# Patient Record
Sex: FEMALE | Race: WHITE | NOT HISPANIC OR LATINO | URBAN - METROPOLITAN AREA
[De-identification: names, ages, dates, MRNs, and addresses within clinical notes are randomized per-mention and may not be internally consistent; named-entity substitution may affect disease eponyms.]

---

## 2018-03-13 NOTE — PATIENT DISCUSSION
PINGUECULA, OD&gt;OS:  INFLAMED OD - LOTEMAX QID THEN BID. PRESCRIBED ARTIFICIAL TEARS BID - QID, OU.   RETURN FOR FOLLOW UP.

## 2018-03-13 NOTE — PATIENT DISCUSSION
Pinguecula Counseling:  I have explained to the patient at length the diagnosis of pinguecula and its pathophysiology. I recommended the patient adequately protect their eyes from excessive UV light and dry, louann conditions. The use of artificial tears in dry conditions was encouraged. Return for follow-up as scheduled.

## 2018-04-27 NOTE — PATIENT DISCUSSION
New Prescription: TobraDex (tobramycin-dexamethasone): ointment: 0.3-0.1% 1 a small amount at bedtime as directed into affected eye 04-

## 2018-04-27 NOTE — PATIENT DISCUSSION
PINGUECULA, OD&gt;OS: INFLAMED OD -CONTINUE LOTEMAX BID. PRESCRIBED TOBRADEX GUILLE QHS. CONSULT DR WILLIAMSON FOR CONSULT. CONTINUE ARTIFICIAL TEARS BID - QID, OU.  RETURN FOR FOLLOW UP.

## 2018-04-27 NOTE — PATIENT DISCUSSION
MILD DRY EYES: CONTINUE ARTIFICIAL TEARS BID - QID, OU RETURN FOR FOLLOW-UP AS SCHEDULED OR SOONER IF SYMPTOMS WORSEN.

## 2018-05-18 NOTE — PATIENT DISCUSSION
pt believes it has been present for about 3 months. Early stages of Pterygium. Use Lotemax sparingly. Encouraged sun coverage and AT. Explained Pterygium Excision, pt stated not ready for sx at this time. Maybe around December? pt is  for job, try to find down time of 2 weeks at sx. Follow.

## 2018-05-23 ENCOUNTER — IMPORTED ENCOUNTER (OUTPATIENT)
Dept: URBAN - METROPOLITAN AREA CLINIC 9 | Facility: CLINIC | Age: 83
End: 2018-05-23

## 2018-06-04 ENCOUNTER — IMPORTED ENCOUNTER (OUTPATIENT)
Dept: URBAN - METROPOLITAN AREA CLINIC 9 | Facility: CLINIC | Age: 83
End: 2018-06-04

## 2018-06-11 ENCOUNTER — IMPORTED ENCOUNTER (OUTPATIENT)
Dept: URBAN - METROPOLITAN AREA CLINIC 9 | Facility: CLINIC | Age: 83
End: 2018-06-11

## 2018-06-21 ENCOUNTER — IMPORTED ENCOUNTER (OUTPATIENT)
Dept: URBAN - METROPOLITAN AREA CLINIC 9 | Facility: CLINIC | Age: 83
End: 2018-06-21

## 2018-11-29 NOTE — PATIENT DISCUSSION
Removal of Pterygium OD scheduled 12/17/2018  Patient's mom will be with her for surgery that day. Patient knows that her mom will need to stay with her during the surgery. Patient reports that she usually gets nauseated after surgery, much discussion about medications needed. Patient understands that she will need to stay away from horses and kady for about 2 weeks after the surgery.

## 2018-12-18 NOTE — PATIENT DISCUSSION
New Prescription: Maxitrol (neomycin-polymyxin-dexameth): ointment: 3.5-10,000-0.1 mg-unit/g-% a small amount once a day into left eye 12-

## 2018-12-27 NOTE — PATIENT DISCUSSION
Continue: Maxitrol (neomycin-polymyxin-dexameth): ointment: 3.5-10,000-0.1 mg-unit/g-% a small amount once a day into left eye 12-

## 2019-01-10 NOTE — PATIENT DISCUSSION
Continue: Maxitrol (neomycin-polymyxin-dexameth): ointment: 3.5-10,000-0.1 mg-unit/g-% 1 a small amount twice a day into right eye 12-

## 2019-06-10 ENCOUNTER — IMPORTED ENCOUNTER (OUTPATIENT)
Dept: URBAN - METROPOLITAN AREA CLINIC 9 | Facility: CLINIC | Age: 84
End: 2019-06-10

## 2019-06-20 NOTE — PATIENT DISCUSSION
Patient can try Lumify to see if it helps with coloring (yellow)  which she feels OS look like with pingueculum.

## 2019-08-15 ENCOUNTER — IMPORTED ENCOUNTER (OUTPATIENT)
Dept: URBAN - METROPOLITAN AREA CLINIC 9 | Facility: CLINIC | Age: 84
End: 2019-08-15

## 2019-08-29 ENCOUNTER — IMPORTED ENCOUNTER (OUTPATIENT)
Dept: URBAN - METROPOLITAN AREA CLINIC 9 | Facility: CLINIC | Age: 84
End: 2019-08-29

## 2019-09-16 ENCOUNTER — IMPORTED ENCOUNTER (OUTPATIENT)
Dept: URBAN - METROPOLITAN AREA CLINIC 9 | Facility: CLINIC | Age: 84
End: 2019-09-16

## 2020-02-03 ENCOUNTER — IMPORTED ENCOUNTER (OUTPATIENT)
Dept: URBAN - METROPOLITAN AREA CLINIC 9 | Facility: CLINIC | Age: 85
End: 2020-02-03

## 2020-09-11 ENCOUNTER — IMPORTED ENCOUNTER (OUTPATIENT)
Dept: URBAN - METROPOLITAN AREA CLINIC 9 | Facility: CLINIC | Age: 85
End: 2020-09-11

## 2020-09-11 PROBLEM — H16.223: Noted: 2020-09-11

## 2020-09-11 PROBLEM — H10.13: Noted: 2020-09-11

## 2020-09-11 PROBLEM — H43.813: Noted: 2020-09-11

## 2021-10-18 ASSESSMENT — VISUAL ACUITY
OS_SC: 20/40 - SN
OD_SC: 20/30 + SN
OS_SC: 20/30 -2 SN
OD_SC: 20/30 + SN
OD_SC: 20/40 SN
OS_SC: 20/25 - SN
OS_SC: 20/25 -2 SN
OD_SC: 20/40 -2 SN
OS_SC: 20/40 + SN
OS_SC: 20/40 - SN
OD_SC: 20/40 SN
OD_SC: 20/40 + SN
OS_SC: 20/30 SN
OS_SC: 20/30 SN
OD_SC: 20/25 -2 SN
OD_SC: 20/30 - SN
OS_SC: 20/25 - SN
OD_SC: 20/40 SN
OD_SC: 20/30 SN

## 2021-10-18 ASSESSMENT — TONOMETRY
OS_IOP_MMHG: 12
OD_IOP_MMHG: 12
OS_IOP_MMHG: 12
OD_IOP_MMHG: 10
OD_IOP_MMHG: 10
OD_IOP_MMHG: 13
OS_IOP_MMHG: 10
OS_IOP_MMHG: 10
OS_IOP_MMHG: 11
OS_IOP_MMHG: 12
OS_IOP_MMHG: 10
OD_IOP_MMHG: 10
OD_IOP_MMHG: 10
OS_IOP_MMHG: 9
OD_IOP_MMHG: 8
OS_IOP_MMHG: 11
OD_IOP_MMHG: 12
OS_IOP_MMHG: 10
OD_IOP_MMHG: 8
OD_IOP_MMHG: 10

## 2022-04-01 ENCOUNTER — EMERGENCY VISIT (OUTPATIENT)
Dept: URBAN - METROPOLITAN AREA CLINIC 4 | Facility: CLINIC | Age: 87
End: 2022-04-01

## 2022-04-01 DIAGNOSIS — S05.01XA: ICD-10-CM

## 2022-04-01 PROCEDURE — 99214 OFFICE O/P EST MOD 30 MIN: CPT

## 2022-04-01 ASSESSMENT — TONOMETRY
OD_IOP_MMHG: 9
OS_IOP_MMHG: 9

## 2022-04-01 ASSESSMENT — VISUAL ACUITY
OD_SC: 20/40
OS_SC: 20/40+1
OU_SC: 20/30-1

## 2022-06-23 ENCOUNTER — ESTABLISHED PATIENT (OUTPATIENT)
Dept: URBAN - METROPOLITAN AREA CLINIC 4 | Facility: CLINIC | Age: 87
End: 2022-06-23

## 2022-06-23 DIAGNOSIS — H16.223: ICD-10-CM

## 2022-06-23 DIAGNOSIS — H43.813: ICD-10-CM

## 2022-06-23 PROCEDURE — 99214 OFFICE O/P EST MOD 30 MIN: CPT

## 2022-06-23 PROCEDURE — 92134 CPTRZ OPH DX IMG PST SGM RTA: CPT

## 2022-06-23 ASSESSMENT — VISUAL ACUITY
OS_GLARE: 20/100
OD_SC: 20/40
OD_GLARE: 20/100
OS_SC: 20/40

## 2022-06-23 ASSESSMENT — TONOMETRY
OD_IOP_MMHG: 10
OS_IOP_MMHG: 9

## 2022-09-01 ENCOUNTER — ESTABLISHED PATIENT (OUTPATIENT)
Dept: URBAN - METROPOLITAN AREA CLINIC 4 | Facility: CLINIC | Age: 87
End: 2022-09-01

## 2022-09-01 DIAGNOSIS — H04.123: ICD-10-CM

## 2022-09-01 DIAGNOSIS — H16.223: ICD-10-CM

## 2022-09-01 DIAGNOSIS — H43.813: ICD-10-CM

## 2022-09-01 PROCEDURE — 99213 OFFICE O/P EST LOW 20 MIN: CPT

## 2022-09-01 ASSESSMENT — VISUAL ACUITY
OD_SC: 20/50
OS_SC: 20/60

## 2022-09-01 ASSESSMENT — TONOMETRY
OS_IOP_MMHG: 12
OD_IOP_MMHG: 11

## 2022-12-02 ENCOUNTER — ESTABLISHED PATIENT (OUTPATIENT)
Dept: URBAN - METROPOLITAN AREA CLINIC 4 | Facility: CLINIC | Age: 87
End: 2022-12-02

## 2022-12-02 PROCEDURE — 99213 OFFICE O/P EST LOW 20 MIN: CPT

## 2022-12-02 ASSESSMENT — VISUAL ACUITY
OD_SC: 20/70
OS_SC: 20/40-2

## 2022-12-02 ASSESSMENT — TONOMETRY
OD_IOP_MMHG: 13
OS_IOP_MMHG: 12

## 2023-07-18 ENCOUNTER — ESTABLISHED PATIENT (OUTPATIENT)
Dept: URBAN - METROPOLITAN AREA CLINIC 4 | Facility: CLINIC | Age: 88
End: 2023-07-18

## 2023-07-18 DIAGNOSIS — H04.123: ICD-10-CM

## 2023-07-18 DIAGNOSIS — H16.223: ICD-10-CM

## 2023-07-18 DIAGNOSIS — H43.813: ICD-10-CM

## 2023-07-18 PROCEDURE — 92014 COMPRE OPH EXAM EST PT 1/>: CPT

## 2023-07-18 PROCEDURE — 92134 CPTRZ OPH DX IMG PST SGM RTA: CPT

## 2023-07-18 ASSESSMENT — TONOMETRY
OD_IOP_MMHG: 12
OS_IOP_MMHG: 12

## 2023-07-18 ASSESSMENT — VISUAL ACUITY
OS_SC: 20/25
OD_SC: 20/70

## 2023-10-16 NOTE — PATIENT DISCUSSION
COUNSELING:
Continue refresh celluvisc OU for foreign body sensation.
Continue: Maxitrol (neomycin-polymyxin-dexameth): ointment: 3.5-10,000-0.1 mg-unit/g-% 1 a thin layer four times a day into right eye
Continue: ofloxacin (ofloxacin): drops: 0.3% 1 drop four times a day into right eye
General:
Medications:
PTERYGIUM OD
Pinguecula Counseling:  I have explained to the patient at length the diagnosis of pinguecula and its pathophysiology. I recommended the patient adequately protect their eyes from excessive UV light and dry, louann conditions. The use of artificial tears in dry conditions was encouraged. Return for follow-up as scheduled.
Pterygium Counseling:  I have explained the diagnosis of pterygium and its pathophysiology. Pterygia are relatively common in the general population and typically have little effect on the vision and eye itself. If the lesion increases in size, it may cause visual symptoms due to induced astigmatism or direct encroachment onto the visual axis. I recommended that the patient use artificial tears to relieve any dryness associated with the pterygium and to increase UV protection. If the pterygium becomes inflamed, uncomfortable or is cosmetically unacceptable, then surgery can be performed to remove the growth. Also, if the pterygium has caused astigmatism, the growth may need to be removed prior to cataract testing and surgery. The recurrence rate after surgery has been explained to the patient along with the need for pathological examination of the pterygium. Return for follow-up as scheduled.
apply a small strip to eyelids at bedtime
pt believes it has been present for about 4 months. Early stages of Pterygium. Use Lotemax sparingly. Encouraged sun coverage and AT. Explained Pterygium Exci pt Patient would like surgery in August will find down time of 2 weeks following sx.
start 3 days before surgery,
Oriented - self; Oriented - place; Oriented - time

## 2025-05-05 ENCOUNTER — COMPREHENSIVE EXAM (OUTPATIENT)
Age: OVER 89
End: 2025-05-05

## 2025-05-05 DIAGNOSIS — H43.813: ICD-10-CM

## 2025-05-05 DIAGNOSIS — H16.223: ICD-10-CM

## 2025-05-05 DIAGNOSIS — Z96.1: ICD-10-CM

## 2025-05-05 DIAGNOSIS — H10.13: ICD-10-CM

## 2025-05-05 DIAGNOSIS — H04.123: ICD-10-CM

## 2025-05-05 PROCEDURE — 92014 COMPRE OPH EXAM EST PT 1/>: CPT
